# Patient Record
Sex: FEMALE | Race: WHITE | Employment: UNEMPLOYED | URBAN - METROPOLITAN AREA
[De-identification: names, ages, dates, MRNs, and addresses within clinical notes are randomized per-mention and may not be internally consistent; named-entity substitution may affect disease eponyms.]

---

## 2017-07-11 ENCOUNTER — OFFICE VISIT (OUTPATIENT)
Dept: NEUROLOGY | Age: 67
End: 2017-07-11

## 2017-07-11 VITALS
BODY MASS INDEX: 28.41 KG/M2 | DIASTOLIC BLOOD PRESSURE: 76 MMHG | SYSTOLIC BLOOD PRESSURE: 136 MMHG | HEART RATE: 73 BPM | OXYGEN SATURATION: 98 % | WEIGHT: 176 LBS

## 2017-07-11 DIAGNOSIS — G31.84 MILD COGNITIVE IMPAIRMENT WITH MEMORY LOSS: ICD-10-CM

## 2017-07-11 DIAGNOSIS — R56.9 SEIZURES (HCC): Primary | ICD-10-CM

## 2017-07-11 NOTE — PATIENT INSTRUCTIONS
[facility]   Statement to Patients  April 1, 2014      In an effort to ensure the large volume of patient prescription refills is processed in the most efficient and expeditious manner, we are asking our patients to assist us by calling your Pharmacy for all prescription refills, this will include also your  Mail Order Pharmacy. The pharmacy will contact our office electronically to continue the refill process. Please do not wait until the last minute to call your pharmacy. We need at least 48 hours (2days) to fill prescriptions. We also encourage you to call your pharmacy before going to  your prescription to make sure it is ready. With regard to controlled substance prescription refill requests (narcotic refills) that need to be picked up at our office, we ask your cooperation by providing us with at least 72 hours (3days) notice that you will need a refill. We will not refill narcotic prescription refill requests after 4:00pm on any weekday, Monday through Thursday, or after 2:00pm on Fridays, or on the weekends. We encourage everyone to explore another way of getting your prescription refill request processed using [employer], our patient web portal through our electronic medical record system. RepRegen is an efficient and effective way to communicate your medication request directly to the office and  downloadable as an angeles on your smart phone . RepRegen also features a review functionality that allows you to view your medication list as well as leave messages for your physician. Are you ready to get connected? If so please review the attatched instructions or speak to any of our staff to get you set up right away! Thank you so much for your cooperation. Should you have any questions please contact our Practice Administrator.     The Physicians and Staff,  [facility]

## 2017-07-11 NOTE — MR AVS SNAPSHOT
Visit Information Date & Time Provider Department Dept. Phone Encounter #  
 7/11/2017  9:40 AM Rhoda Neal MD [facility] Neurology [facility] [phone] [id number] Follow-up Instructions Return in about 6 months (around 1/11/2018). Upcoming Health Maintenance Date Due Hepatitis C Screening 1950 DTaP/Tdap/Td series (1 - Tdap) 1/9/1971 BREAST CANCER SCRN MAMMOGRAM 1/9/2000 FOBT Q 1 YEAR AGE 50-75 1/9/2000 ZOSTER VACCINE AGE 60> 1/9/2010 GLAUCOMA SCREENING Q2Y 1/9/2015 OSTEOPOROSIS SCREENING (DEXA) 1/9/2015 Pneumococcal 65+ Low/Medium Risk (1 of 2 - PCV13) 1/9/2015 MEDICARE YEARLY EXAM 1/9/2015 INFLUENZA AGE 9 TO ADULT 8/1/2017 Allergies as of 7/11/2017  Review Complete On: 7/11/2017 By: Madisyn Barcenas LPN No Known Allergies Current Immunizations  Never Reviewed No immunizations on file. Not reviewed this visit You Were Diagnosed With   
  
 Codes Comments Seizures ([facility]    -  Primary ICD-10-CM: R56.9 ICD-9-CM: 780.39 Mild cognitive impairment with memory loss     ICD-10-CM: G31.84 ICD-9-CM: 331.83, 780.93 Vitals BP Pulse Weight(growth percentile) SpO2 BMI OB Status 136/76 73 176 lb (79.8 kg) 98% 28.41 kg/m2 Menopause Smoking Status Passive Smoke Exposure - Never Smoker BMI and BSA Data Body Mass Index Body Surface Area  
 28.41 kg/m 2 1.93 m 2 Your Updated Medication List  
  
   
This list is accurate as of: 7/11/17 10:09 AM.  Always use your most recent med list.  
  
  
  
  
 ALEVE 220 mg tablet Generic drug:  naproxen sodium Take 220 mg by mouth two (2) times daily (with meals). Indications: PAIN  
  
 clorazepate 7.5 mg tablet Commonly known as:  TRANXENE Take 7.5 mg by mouth three (3) times daily. docusate sodium 50 mg capsule Commonly known as:  Lylia Sella Take two tabs twice daily for two weeks, then twice a day as needed thereafter. Hold for loose stools. FLUoxetine 10 mg capsule Commonly known as:  PROzac Take  by mouth daily. ondansetron 8 mg disintegrating tablet Commonly known as:  ZOFRAN ODT Take 1 tablet by mouth every eight (8) hours as needed for Nausea. OXcarbaxepine 600 mg tablet Commonly known as:  TRILEPTAL Take 900 mg by mouth two (2) times a day. oxyCODONE-acetaminophen 5-325 mg per tablet Commonly known as:  PERCOCET  
1-2 tabs every 4hrs as needed for pain. Maximum daily dose 12 tablets. TYLENOL EXTRA STRENGTH 500 mg tablet Generic drug:  acetaminophen Take 1,000 mg by mouth every six (6) hours as needed for Pain. Follow-up Instructions Return in about 6 months (around 1/11/2018). Patient Instructions PRESCRIPTION REFILL POLICY [facility] Statement to Patients April 1, 2014 In an effort to ensure the large volume of patient prescription refills is processed in the most efficient and expeditious manner, we are asking our patients to assist us by calling your Pharmacy for all prescription refills, this will include also your  Mail Order Pharmacy. The pharmacy will contact our office electronically to continue the refill process. Please do not wait until the last minute to call your pharmacy. We need at least 48 hours (2days) to fill prescriptions. We also encourage you to call your pharmacy before going to  your prescription to make sure it is ready. With regard to controlled substance prescription refill requests (narcotic refills) that need to be picked up at our office, we ask your cooperation by providing us with at least 72 hours (3days) notice that you will need a refill. We will not refill narcotic prescription refill requests after 4:00pm on any weekday, Monday through Thursday, or after 2:00pm on Fridays, or on the weekends. We encourage everyone to explore another way of getting your prescription refill request processed using [employer], our patient web portal through our electronic medical record system. [employer] is an efficient and effective way to communicate your medication request directly to the office and  downloadable as an angeles on your smart phone . [employer] also features a review functionality that allows you to view your medication list as well as leave messages for your physician. Are you ready to get connected? If so please review the attatched instructions or speak to any of our staff to get you set up right away! Thank you so much for your cooperation. Should you have any questions please contact our Practice Administrator. The Physicians and Staff,  [facility] Introducing [facility] SERVICES! [facility] introduces [employer] patient portal. Now you can access parts of your medical record, email your doctor's office, and request medication refills online. 1. In your internet browser, go to [email or web address] 2. Click on the First Time User? Click Here link in the Sign In box. You will see the New Member Sign Up page. 3. Enter your [employer] Access Code exactly as it appears below. You will not need to use this code after youve completed the sign-up process. If you do not sign up before the expiration date, you must request a new code. · [employer] Access Code: [email or web address] Expires: 10/9/2017 10:09 AM 
 
4. Enter the last four digits of your Social Security Number (xxxx) and Date of Birth (mm/dd/yyyy) as indicated and click Submit. You will be taken to the next sign-up page. 5. Create a Noknokert ID. This will be your [employer] login ID and cannot be changed, so think of one that is secure and easy to remember. 6. Create a [employer] password. You can change your password at any time. 7. Enter your Password Reset Question and Answer.  This can be used at a later time if you forget your password. 8. Enter your e-mail address. You will receive e-mail notification when new information is available in [address] 9. Click Sign Up. You can now view and download portions of your medical record. 10. Click the Download Summary menu link to download a portable copy of your medical information. If you have questions, please visit the Frequently Asked Questions section of the [employer]Wi[employer] website. Remember, [employer] is NOT to be used for urgent needs. For medical emergencies, dial 911. Now available from your iPhone and Android! Please provide this summary of care documentation to your next provider. Your primary care clinician is listed as Iraj Rinaldi. If you have any questions after today's visit, please call [phone].

## 2017-07-11 NOTE — PROGRESS NOTES
Neurology Progress Note    Patient ID:  Amina Elizabeth  [id number]  89 y.o.  1950      Subjective:   History:  Amina Elizabeth is a 77 y.o. female who  has a past medical history of Seizures who comes in with several concerns. Since she was 12 yo, patient was having episodes described as blank stares with clenching of the R hand with lip smacking, lasting for 10 secs concerning for generalized seizures. Patient was previously on Dilantin and Phenobarbital. Patient also noted memory issue described as asking the same questions and forgetting conversations, able to remember where she places stuff, partner takes care of finances, able to remember names, independent in all ADL's. Neuropsych testing revealed MMSE 27/30; impairments with verbal fluency, confrontation naming, verbal comprehension, visual attention, auditory learning, and auditory memory. At the same time, her mental status, processing speed, working memory, bilateral motor skills, perceptual reasoning, and executive functioning remain normal. Considerations include mild cognitive impairment or early stage of dementia.     Since the last time, patient's cognitive issue has remained the same. Has not started Vit E. No seizures on Trileptal.       Objective:   ROS:  Per HPI-  Otherwise 12 point ROS was negative    Meds:  Current Outpatient Prescriptions on File Prior to Visit   Medication Sig Dispense Refill    FLUoxetine (PROZAC) 10 mg capsule Take  by mouth daily.  naproxen sodium (ALEVE) 220 mg tablet Take 220 mg by mouth two (2) times daily (with meals). Indications: PAIN      oxyCODONE-acetaminophen (PERCOCET) 5-325 mg per tablet 1-2 tabs every 4hrs as needed for pain. Maximum daily dose 12 tablets. 100 tablet 0    clorazepate (TRANXENE) 7.5 mg tablet Take 7.5 mg by mouth three (3) times daily.  OXcarbaxepine (TRILEPTAL) 600 mg tablet Take 900 mg by mouth two (2) times a day.       acetaminophen (TYLENOL EXTRA STRENGTH) 500 mg tablet Take 1,000 mg by mouth every six (6) hours as needed for Pain.  docusate sodium (COLACE) 50 mg capsule Take two tabs twice daily for two weeks, then twice a day as needed thereafter. Hold for loose stools. 60 capsule 2    ondansetron (ZOFRAN ODT) 8 mg disintegrating tablet Take 1 tablet by mouth every eight (8) hours as needed for Nausea. 20 tablet 2     No current facility-administered medications on file prior to visit. Imaging:    CT Results (recent):  No results found for this or any previous visit. MRI Results (recent):  No results found for this or any previous visit. IR Results (recent):  No results found for this or any previous visit. VAS/US Results (recent):  No results found for this or any previous visit. Reviewed records in [employer] today    Lab Review     No results found for any previous visit. Exam:  Visit Vitals    /76    Pulse 73    Wt 79.8 kg (176 lb)    SpO2 98%    BMI 28.41 kg/m2     Gen: Awake, alert, follows commands  Appropriate appearance, normal speech. Oriented to all spheres. No visual field defect on confrontation exam.  Full eyes movement, with no nystagmus, no diplopia, no ptosis. Normal gag and swallow. All remaining cranial nerves were normal  Motor function: 5/5 in all extremities  Sensory: intact to LT, PP and JPS  Good FTN and HTS   Gait: Normal    Assessment:     1. Seizures ([address]    2. Mild cognitive impairment with memory loss            Plan:   1. Advised to start taking vitamin E supplement 400 international units next   2. Advised to keep mind active exercise and healthy lifestyle  3. Continue Trileptal 600 mg 1-1/2 tablets twice a day for seizures   4. Depending on above we will consider repeating neuropsychological evaluation     Follow-up Disposition:  Return in about 6 months (around 1/11/2018).           Nichol Nelson MD  Diplomate, American Board of Psychiatry and Neurology  Diplomate, Neuromuscular Medicine  Diplomate, American Board of Electrodiagnostic Medicine

## 2017-10-25 ENCOUNTER — TELEPHONE (OUTPATIENT)
Dept: NEUROLOGY | Age: 67
End: 2017-10-25

## 2017-10-26 NOTE — TELEPHONE ENCOUNTER
Patient called wanting a refill on Clorazepate 7.5 mg TID. Nurse made patient she will need to have the prescribing MD which is her PCP to order Clorazepate 7.5 mg. Patient verbalized understanding.

## 2017-10-26 NOTE — TELEPHONE ENCOUNTER
PT called again regarding medication question received yesterday. PT will run out by end of the week. Please call PT asap.

## 2018-01-12 ENCOUNTER — OFFICE VISIT (OUTPATIENT)
Dept: NEUROLOGY | Age: 68
End: 2018-01-12

## 2018-01-12 VITALS
DIASTOLIC BLOOD PRESSURE: 76 MMHG | BODY MASS INDEX: 28.89 KG/M2 | OXYGEN SATURATION: 99 % | SYSTOLIC BLOOD PRESSURE: 138 MMHG | WEIGHT: 179 LBS | HEART RATE: 61 BPM

## 2018-01-12 DIAGNOSIS — G31.84 MILD COGNITIVE IMPAIRMENT WITH MEMORY LOSS: Primary | ICD-10-CM

## 2018-01-12 DIAGNOSIS — R56.9 SEIZURES (HCC): ICD-10-CM

## 2018-01-12 NOTE — MR AVS SNAPSHOT
Visit Information Date & Time Provider Department Dept. Phone Encounter #  
 1/12/2018 10:40 AM Vannessa Mendez MD [facility] [phone] [id number] Follow-up Instructions Return in about 1 year (around 1/12/2019). Your Appointments 1/12/2018 10:40 AM  
Follow Up with Vannessa Mendez MD  
[facility] Appt Note: 6 month f/u [place] [address] [facility] [address] [facility] [phone]  
  
   
 [place] [address] [id number] [address] Upcoming Health Maintenance Date Due Hepatitis C Screening 1950 DTaP/Tdap/Td series (1 - Tdap) 1/9/1971 BREAST CANCER SCRN MAMMOGRAM 1/9/2000 FOBT Q 1 YEAR AGE 50-75 1/9/2000 ZOSTER VACCINE AGE 60> 11/9/2009 GLAUCOMA SCREENING Q2Y 1/9/2015 OSTEOPOROSIS SCREENING (DEXA) 1/9/2015 Pneumococcal 65+ Low/Medium Risk (1 of 2 - PCV13) 1/9/2015 MEDICARE YEARLY EXAM 1/9/2015 Influenza Age 5 to Adult 8/1/2017 Allergies as of 1/12/2018  Review Complete On: 1/12/2018 By: Alek Friend LPN No Known Allergies Current Immunizations  Never Reviewed No immunizations on file. Not reviewed this visit You Were Diagnosed With   
  
 Codes Comments Mild cognitive impairment with memory loss    -  Primary ICD-10-CM: G31.84 ICD-9-CM: 331.83, 780.93 Seizures ([address]     ICD-10-CM: R56.9 ICD-9-CM: 780.39 Vitals BP Pulse Weight(growth percentile) SpO2 BMI OB Status 138/76 61 179 lb (81.2 kg) 99% 28.89 kg/m2 Menopause Smoking Status Passive Smoke Exposure - Never Smoker BMI and BSA Data Body Mass Index Body Surface Area  
 28.89 kg/m 2 1.94 m 2 Preferred Pharmacy Pharmacy Name Phone [facility] [address] [facility] OF [address] [place] [address] ([address] [phone] Your Updated Medication List  
  
   
This list is accurate as of: 1/12/18 10:37 AM.  Always use your most recent med list.  
  
  
  
  
 ALEVE 220 mg tablet Generic drug:  naproxen sodium Take 220 mg by mouth two (2) times daily (with meals). Indications: PAIN  
  
 clorazepate 7.5 mg tablet Commonly known as:  TRANXENE Take 7.5 mg by mouth three (3) times daily. docusate sodium 50 mg capsule Commonly known as:  Alfonse Kras Take two tabs twice daily for two weeks, then twice a day as needed thereafter. Hold for loose stools. FLUoxetine 10 mg capsule Commonly known as:  PROzac Take  by mouth daily. ondansetron 8 mg disintegrating tablet Commonly known as:  ZOFRAN ODT Take 1 tablet by mouth every eight (8) hours as needed for Nausea. OXcarbaxepine 600 mg tablet Commonly known as:  TRILEPTAL Take 900 mg by mouth two (2) times a day. oxyCODONE-acetaminophen 5-325 mg per tablet Commonly known as:  PERCOCET  
1-2 tabs every 4hrs as needed for pain. Maximum daily dose 12 tablets. TYLENOL EXTRA STRENGTH 500 mg tablet Generic drug:  acetaminophen Take 1,000 mg by mouth every six (6) hours as needed for Pain. Follow-up Instructions Return in about 1 year (around 1/12/2019). Patient Instructions PRESCRIPTION REFILL POLICY [facility] Statement to Patients April 1, 2014 In an effort to ensure the large volume of patient prescription refills is processed in the most efficient and expeditious manner, we are asking our patients to assist us by calling your Pharmacy for all prescription refills, this will include also your  Mail Order Pharmacy. The pharmacy will contact our office electronically to continue the refill process. Please do not wait until the last minute to call your pharmacy. We need at least 48 hours (2days) to fill prescriptions.  We also encourage you to call your pharmacy before going to  your prescription to make sure it is ready. With regard to controlled substance prescription refill requests (narcotic refills) that need to be picked up at our office, we ask your cooperation by providing us with at least 72 hours (3days) notice that you will need a refill. We will not refill narcotic prescription refill requests after 4:00pm on any weekday, Monday through Thursday, or after 2:00pm on Fridays, or on the weekends. We encourage everyone to explore another way of getting your prescription refill request processed using [employer], our patient web portal through our electronic medical record system. [employer] is an efficient and effective way to communicate your medication request directly to the office and  downloadable as an angeles on your smart phone . [employer] also features a review functionality that allows you to view your medication list as well as leave messages for your physician. Are you ready to get connected? If so please review the attatched instructions or speak to any of our staff to get you set up right away! Thank you so much for your cooperation. Should you have any questions please contact our Practice Administrator. The Physicians and Staff,  [facility] Introducing [address]! [facility] introduces [facility] [employer] patient portal. Now you can access parts of your medical record, email your doctor's office, and request medication refills online. 1. In your internet browser, go to [email or web address] 2. Click on the First Time User? Click Here link in the Sign In box. You will see the New Member Sign Up page. 3. Enter your [employer] Access Code exactly as it appears below. You will not need to use this code after youve completed the sign-up process. If you do not sign up before the expiration date, you must request a new code. · [employer] Access Code: [email or web address] Expires: 4/12/2018  9:53 AM 
 
4. Enter the last four digits of your Social Security Number (xxxx) and Date of Birth (mm/dd/yyyy) as indicated and click Submit. You will be taken to the next sign-up page. 5. Create a Ploonge ID. This will be your [employer] login ID and cannot be changed, so think of one that is secure and easy to remember. 6. Create a Ploonge password. You can change your password at any time. 7. Enter your Password Reset Question and Answer. This can be used at a later time if you forget your password. 8. Enter your e-mail address. You will receive e-mail notification when new information is available in [address] 9. Click Sign Up. You can now view and download portions of your medical record. 10. Click the Download Summary menu link to download a portable copy of your medical information. If you have questions, please visit the Frequently Asked Questions section of the [employer] website. Remember, [employer] is NOT to be used for urgent needs. For medical emergencies, dial 911. Now available from your iPhone and Android! Please provide this summary of care documentation to your next provider. Your primary care clinician is listed as Arnold Valero. If you have any questions after today's visit, please call [phone].

## 2018-01-12 NOTE — PROGRESS NOTES
Neurology Progress Note    Patient ID:  Ghazala Preston  [id number]  1 y.o.  1950      Subjective:   History:  Monie Thomason a 76 y. o. female who  has a past medical history of Kemi Bellow comes in with several concerns. Since she was 12 yo, patient was having episodes described as blank stares with clenching of the R hand with lip smacking, lasting for 10 sec's concerning for generalized seizures. Patient was previously on Dilantin and Phenobarbital. Patient also noted memory issue described as asking the same questions and forgetting conversations, able to remember where she places stuff, partner takes care of finances, able to remember names, independent in all ADL's. Neuropsych testing revealed MMSE 27/30; impairments with verbal fluency, confrontation naming, verbal comprehension, visual attention, auditory learning, and auditory memory. At the same time, her mental status, processing speed, working memory, bilateral motor skills, perceptual reasoning, and executive functioning remain normal. Considerations include mild cognitive impairment or early stage of dementia.      Patient continues to take Trileptal with no seizures. Still has cognitive issues with good and bad days. Has started Vit E with some benefit. ROS:  Per HPI-  Otherwise the remainder of ROS was negative    Social Hx  Social History     Social History    Marital status: SINGLE     Spouse name: N/A    Number of children: N/A    Years of education: N/A     Social History Main Topics    Smoking status: Passive Smoke Exposure - Never Smoker    Smokeless tobacco: Never Used    Alcohol use No    Drug use: No    Sexual activity: Not Asked     Other Topics Concern    None     Social History Narrative    ** Merged History Encounter **            Meds:  Current Outpatient Prescriptions on File Prior to Visit   Medication Sig Dispense Refill    FLUoxetine (PROZAC) 10 mg capsule Take  by mouth daily.       acetaminophen (TYLENOL EXTRA STRENGTH) 500 mg tablet Take 1,000 mg by mouth every six (6) hours as needed for Pain.  naproxen sodium (ALEVE) 220 mg tablet Take 220 mg by mouth two (2) times daily (with meals). Indications: PAIN      clorazepate (TRANXENE) 7.5 mg tablet Take 7.5 mg by mouth three (3) times daily.  OXcarbaxepine (TRILEPTAL) 600 mg tablet Take 900 mg by mouth two (2) times a day.  oxyCODONE-acetaminophen (PERCOCET) 5-325 mg per tablet 1-2 tabs every 4hrs as needed for pain. Maximum daily dose 12 tablets. 100 tablet 0    docusate sodium (COLACE) 50 mg capsule Take two tabs twice daily for two weeks, then twice a day as needed thereafter. Hold for loose stools. 60 capsule 2    ondansetron (ZOFRAN ODT) 8 mg disintegrating tablet Take 1 tablet by mouth every eight (8) hours as needed for Nausea. 20 tablet 2     No current facility-administered medications on file prior to visit. Imaging:    CT Results (recent):  No results found for this or any previous visit. MRI Results (recent):  No results found for this or any previous visit. IR Results (recent):  No results found for this or any previous visit. VAS/US Results (recent):  No results found for this or any previous visit. Reviewed records in [facility] and [employer] tab today    Lab Review     No results found for any previous visit. Objective:       Exam:  Visit Vitals    /76    Pulse 61    Wt 81.2 kg (179 lb)    SpO2 99%    BMI 28.89 kg/m2     Gen: Awake, alert, follows commands  Appropriate appearance, normal speech. Oriented to all spheres. No visual field defect on confrontation exam.  Full eyes movement, with no nystagmus, no diplopia, no ptosis. Normal gag and swallow.   All remaining cranial nerves were normal  Motor function: 5/5 in all extremities  Sensory: intact to LT, PP and JPS  Good FTN and HTS   Gait: Normal    Assessment:       ICD-10-CM ICD-9-CM    1. Mild cognitive impairment with memory loss G31.84 331.83      780.93    2. Seizures ([facility] [address] R56.9 780.39            Plan:     1.  Continue vitamin E supplement 400 international units next   2.  Continue to keep mind active exercise and healthy lifestyle  3.  Continue Trileptal 600 mg 1-1/2 tablets twice a day for seizures   4.  Depending on above we will consider repeating neuropsychological evaluation       Follow-up Disposition:  Return in about 1 year (around 1/12/2019).           Nikhil Vang MD  Diplomate, American Board of Psychiatry and Neurology  Diplomate, Neuromuscular Medicine  Diplomate, American Board of Electrodiagnostic Medicine